# Patient Record
Sex: FEMALE | Race: OTHER | NOT HISPANIC OR LATINO | URBAN - METROPOLITAN AREA
[De-identification: names, ages, dates, MRNs, and addresses within clinical notes are randomized per-mention and may not be internally consistent; named-entity substitution may affect disease eponyms.]

---

## 2017-12-30 ENCOUNTER — INPATIENT (INPATIENT)
Facility: HOSPITAL | Age: 82
LOS: 0 days | Discharge: HOME | DRG: 392 | End: 2017-12-31
Attending: INTERNAL MEDICINE | Admitting: INTERNAL MEDICINE
Payer: COMMERCIAL

## 2017-12-30 VITALS
OXYGEN SATURATION: 99 % | HEART RATE: 67 BPM | SYSTOLIC BLOOD PRESSURE: 152 MMHG | RESPIRATION RATE: 18 BRPM | TEMPERATURE: 98 F | WEIGHT: 184.97 LBS | HEIGHT: 65 IN | DIASTOLIC BLOOD PRESSURE: 79 MMHG

## 2017-12-30 DIAGNOSIS — E78.5 HYPERLIPIDEMIA, UNSPECIFIED: ICD-10-CM

## 2017-12-30 DIAGNOSIS — Z29.9 ENCOUNTER FOR PROPHYLACTIC MEASURES, UNSPECIFIED: ICD-10-CM

## 2017-12-30 DIAGNOSIS — R07.9 CHEST PAIN, UNSPECIFIED: ICD-10-CM

## 2017-12-30 DIAGNOSIS — K21.9 GASTRO-ESOPHAGEAL REFLUX DISEASE WITHOUT ESOPHAGITIS: ICD-10-CM

## 2017-12-30 DIAGNOSIS — M19.90 UNSPECIFIED OSTEOARTHRITIS, UNSPECIFIED SITE: ICD-10-CM

## 2017-12-30 LAB
ALBUMIN SERPL ELPH-MCNC: 3.4 G/DL — LOW (ref 3.5–5)
ALP SERPL-CCNC: 75 U/L — SIGNIFICANT CHANGE UP (ref 40–120)
ALT FLD-CCNC: 22 U/L DA — SIGNIFICANT CHANGE UP (ref 10–60)
ANION GAP SERPL CALC-SCNC: 9 MMOL/L — SIGNIFICANT CHANGE UP (ref 5–17)
APTT BLD: 28.4 SEC — SIGNIFICANT CHANGE UP (ref 27.5–37.4)
AST SERPL-CCNC: 18 U/L — SIGNIFICANT CHANGE UP (ref 10–40)
BASOPHILS # BLD AUTO: 0.1 K/UL — SIGNIFICANT CHANGE UP (ref 0–0.2)
BASOPHILS NFR BLD AUTO: 1 % — SIGNIFICANT CHANGE UP (ref 0–2)
BILIRUB SERPL-MCNC: 0.3 MG/DL — SIGNIFICANT CHANGE UP (ref 0.2–1.2)
BUN SERPL-MCNC: 17 MG/DL — SIGNIFICANT CHANGE UP (ref 7–18)
CALCIUM SERPL-MCNC: 9.1 MG/DL — SIGNIFICANT CHANGE UP (ref 8.4–10.5)
CHLORIDE SERPL-SCNC: 106 MMOL/L — SIGNIFICANT CHANGE UP (ref 96–108)
CK MB BLD-MCNC: 1.8 % — SIGNIFICANT CHANGE UP (ref 0–3.5)
CK MB CFR SERPL CALC: 1.5 NG/ML — SIGNIFICANT CHANGE UP (ref 0–3.6)
CK SERPL-CCNC: 85 U/L — SIGNIFICANT CHANGE UP (ref 21–215)
CO2 SERPL-SCNC: 27 MMOL/L — SIGNIFICANT CHANGE UP (ref 22–31)
CREAT SERPL-MCNC: 0.94 MG/DL — SIGNIFICANT CHANGE UP (ref 0.5–1.3)
EOSINOPHIL # BLD AUTO: 0.2 K/UL — SIGNIFICANT CHANGE UP (ref 0–0.5)
EOSINOPHIL NFR BLD AUTO: 3.3 % — SIGNIFICANT CHANGE UP (ref 0–6)
GLUCOSE SERPL-MCNC: 94 MG/DL — SIGNIFICANT CHANGE UP (ref 70–99)
HCT VFR BLD CALC: 40.5 % — SIGNIFICANT CHANGE UP (ref 34.5–45)
HGB BLD-MCNC: 12.9 G/DL — SIGNIFICANT CHANGE UP (ref 11.5–15.5)
INR BLD: 1.07 RATIO — SIGNIFICANT CHANGE UP (ref 0.88–1.16)
LYMPHOCYTES # BLD AUTO: 1.5 K/UL — SIGNIFICANT CHANGE UP (ref 1–3.3)
LYMPHOCYTES # BLD AUTO: 27.6 % — SIGNIFICANT CHANGE UP (ref 13–44)
MCHC RBC-ENTMCNC: 27.5 PG — SIGNIFICANT CHANGE UP (ref 27–34)
MCHC RBC-ENTMCNC: 31.8 GM/DL — LOW (ref 32–36)
MCV RBC AUTO: 86.5 FL — SIGNIFICANT CHANGE UP (ref 80–100)
MONOCYTES # BLD AUTO: 0.5 K/UL — SIGNIFICANT CHANGE UP (ref 0–0.9)
MONOCYTES NFR BLD AUTO: 9 % — SIGNIFICANT CHANGE UP (ref 2–14)
NEUTROPHILS # BLD AUTO: 3.2 K/UL — SIGNIFICANT CHANGE UP (ref 1.8–7.4)
NEUTROPHILS NFR BLD AUTO: 59.1 % — SIGNIFICANT CHANGE UP (ref 43–77)
PLATELET # BLD AUTO: 186 K/UL — SIGNIFICANT CHANGE UP (ref 150–400)
POTASSIUM SERPL-MCNC: 4.7 MMOL/L — SIGNIFICANT CHANGE UP (ref 3.5–5.3)
POTASSIUM SERPL-SCNC: 4.7 MMOL/L — SIGNIFICANT CHANGE UP (ref 3.5–5.3)
PROT SERPL-MCNC: 6.9 G/DL — SIGNIFICANT CHANGE UP (ref 6–8.3)
PROTHROM AB SERPL-ACNC: 11.7 SEC — SIGNIFICANT CHANGE UP (ref 9.8–12.7)
RBC # BLD: 4.69 M/UL — SIGNIFICANT CHANGE UP (ref 3.8–5.2)
RBC # FLD: 15.2 % — HIGH (ref 10.3–14.5)
SODIUM SERPL-SCNC: 142 MMOL/L — SIGNIFICANT CHANGE UP (ref 135–145)
TROPONIN I SERPL-MCNC: <0.015 NG/ML — SIGNIFICANT CHANGE UP (ref 0–0.04)
WBC # BLD: 5.3 K/UL — SIGNIFICANT CHANGE UP (ref 3.8–10.5)
WBC # FLD AUTO: 5.3 K/UL — SIGNIFICANT CHANGE UP (ref 3.8–10.5)

## 2017-12-30 PROCEDURE — 71020: CPT | Mod: 26

## 2017-12-30 PROCEDURE — 99285 EMERGENCY DEPT VISIT HI MDM: CPT

## 2017-12-30 RX ORDER — FAMOTIDINE 10 MG/ML
20 INJECTION INTRAVENOUS
Qty: 0 | Refills: 0 | Status: DISCONTINUED | OUTPATIENT
Start: 2017-12-30 | End: 2017-12-31

## 2017-12-30 RX ORDER — ASPIRIN/CALCIUM CARB/MAGNESIUM 324 MG
162 TABLET ORAL ONCE
Qty: 0 | Refills: 0 | Status: COMPLETED | OUTPATIENT
Start: 2017-12-30 | End: 2017-12-30

## 2017-12-30 RX ORDER — ASPIRIN/CALCIUM CARB/MAGNESIUM 324 MG
81 TABLET ORAL DAILY
Qty: 0 | Refills: 0 | Status: DISCONTINUED | OUTPATIENT
Start: 2017-12-30 | End: 2017-12-31

## 2017-12-30 RX ORDER — SODIUM CHLORIDE 9 MG/ML
3 INJECTION INTRAMUSCULAR; INTRAVENOUS; SUBCUTANEOUS ONCE
Qty: 0 | Refills: 0 | Status: COMPLETED | OUTPATIENT
Start: 2017-12-30 | End: 2017-12-30

## 2017-12-30 RX ORDER — ENOXAPARIN SODIUM 100 MG/ML
40 INJECTION SUBCUTANEOUS DAILY
Qty: 0 | Refills: 0 | Status: DISCONTINUED | OUTPATIENT
Start: 2017-12-30 | End: 2017-12-31

## 2017-12-30 RX ORDER — METOPROLOL TARTRATE 50 MG
12.5 TABLET ORAL
Qty: 0 | Refills: 0 | Status: DISCONTINUED | OUTPATIENT
Start: 2017-12-30 | End: 2017-12-31

## 2017-12-30 RX ORDER — ATORVASTATIN CALCIUM 80 MG/1
40 TABLET, FILM COATED ORAL AT BEDTIME
Qty: 0 | Refills: 0 | Status: DISCONTINUED | OUTPATIENT
Start: 2017-12-30 | End: 2017-12-31

## 2017-12-30 RX ORDER — METOPROLOL TARTRATE 50 MG
25 TABLET ORAL
Qty: 0 | Refills: 0 | Status: DISCONTINUED | OUTPATIENT
Start: 2017-12-30 | End: 2017-12-30

## 2017-12-30 RX ORDER — METOCLOPRAMIDE HCL 10 MG
1 TABLET ORAL
Qty: 0 | Refills: 0 | COMMUNITY

## 2017-12-30 RX ADMIN — SODIUM CHLORIDE 3 MILLILITER(S): 9 INJECTION INTRAMUSCULAR; INTRAVENOUS; SUBCUTANEOUS at 15:41

## 2017-12-30 RX ADMIN — ATORVASTATIN CALCIUM 40 MILLIGRAM(S): 80 TABLET, FILM COATED ORAL at 23:01

## 2017-12-30 RX ADMIN — Medication 81 MILLIGRAM(S): at 23:01

## 2017-12-30 RX ADMIN — Medication 162 MILLIGRAM(S): at 15:44

## 2017-12-30 NOTE — H&P ADULT - MUSCULOSKELETAL
detailed exam no joint erythema/no joint warmth/normal strength/no joint swelling/ROM intact/no calf tenderness

## 2017-12-30 NOTE — H&P ADULT - HISTORY OF PRESENT ILLNESS
89 Os[place] speaking F ( daughter at bedside interpreting for patient) from home lives alone, no HHA, physically active, with PMH of HLD, arthritis, GERD, presented to ED with c/o chest pain for 10 days. Patient describes chest pain as left sided, non radiating, intermittent, 5-6 intensity, stabbing kind, associated with diaphoresis, no aggravating factors but relieved with rest, denies any SOB, palpitations, nausea, vomiting, dizziness. Patient got concerned as symptoms were not resolving so decided to come to ED. Otherwise denies any other symptoms.    In ED, patient had stable vitals.  EKG- NSR @66BPM , RBBB, non specific T wave changes.  cardiac enzymes x1- negative, CXR s/o  no acute pulmonary disease. Labs WNL , s/p 1 dose of aspirin 162 mg in ED    will admit to telemetry for evaluation of chest pain to r/o ACS.

## 2017-12-30 NOTE — H&P ADULT - NSHPLABSRESULTS_GEN_ALL_CORE
ICU Vital Signs Last 24 Hrs  T(C): 36.7 (30 Dec 2017 19:43), Max: 36.8 (30 Dec 2017 16:13)  T(F): 98.1 (30 Dec 2017 19:43), Max: 98.2 (30 Dec 2017 16:13)  HR: 64 (30 Dec 2017 19:43) (64 - 67)  BP: 134/90 (30 Dec 2017 19:43) (134/90 - 152/79)  BP(mean): --  ABP: --  ABP(mean): --  RR: 18 (30 Dec 2017 19:43) (18 - 18)  SpO2: 97% (30 Dec 2017 19:43) (97% - 99%)                          12.9   5.3   )-----------( 186      ( 30 Dec 2017 16:27 )             40.5       12-30    142  |  106  |  17  ----------------------------<  94  4.7   |  27  |  0.94    Ca    9.1      30 Dec 2017 16:27    TPro  6.9  /  Alb  3.4<L>  /  TBili  0.3  /  DBili  x   /  AST  18  /  ALT  22  /  AlkPhos  75  12-30                  PT/INR - ( 30 Dec 2017 16:27 )   PT: 11.7 sec;   INR: 1.07 ratio         PTT - ( 30 Dec 2017 16:27 )  PTT:28.4 sec    Lactate Trend      CARDIAC MARKERS ( 30 Dec 2017 16:27 )  <0.015 ng/mL / x     / 85 U/L / x     / 1.5 ng/mL  < from: Xray Chest 2 Views PA/Lat (12.30.17 @ 15:25) >    IMPRESSION: Clear lungs.    < end of copied text >

## 2017-12-30 NOTE — H&P ADULT - RS GEN PE MLT RESP DETAILS PC
no rales/respirations non-labored/no rhonchi/airway patent/breath sounds equal/good air movement/clear to auscultation bilaterally

## 2017-12-30 NOTE — ED ADULT NURSE NOTE - OBJECTIVE STATEMENT
Patient came to the ED sent by PMD for non radiating chest pain  x 2 days. Pt is a/o x 3 no complaints of any dizziness/ syncope.

## 2017-12-30 NOTE — H&P ADULT - PROBLEM SELECTOR PLAN 3
Patient takes some medication for arthritis, but does not remember the name, primary team to update medrics, daughter to get medication in AM

## 2017-12-30 NOTE — H&P ADULT - NSHPREVIEWOFSYSTEMS_GEN_ALL_CORE
ROS negative for fever, SOB, cough, rash, headache, dizziness, diaphoresis, palpitations, abdominal pain, weakness, numbness, tingling of extremities, diarrhea, constipation, urinary disturbances.

## 2017-12-30 NOTE — ED PROVIDER NOTE - OBJECTIVE STATEMENT
89 F with hx of HLD complaining of chest pain for a few days.  Non radiating, no shortness of breath, L sided.  No other complaints.

## 2017-12-30 NOTE — H&P ADULT - PROBLEM SELECTOR PLAN 1
likely non cardiac, more likely due to GERD, but given Hx of HLD, female sex will admit to telemetry to r/o ACS.  EKG- NSR @66BPM , RBBB, non specific T wave changes.  cardiac enzymes x1- negative  KAREEM score of 3  will start on aspirin, statin and low dose lopressor.  will trend T2, T3.  will get echo, tsh, a1c, lipid profile.

## 2017-12-30 NOTE — H&P ADULT - PROBLEM SELECTOR PLAN 4
patient takes famotidine BID and metoclopramide at home.  will give only famotidine 20 mg BID at home.

## 2017-12-30 NOTE — H&P ADULT - NEUROLOGICAL DETAILS
deep reflexes intact/normal strength/alert and oriented x 3/sensation intact/cranial nerves intact/superficial reflexes intact

## 2017-12-30 NOTE — H&P ADULT - ASSESSMENT
89 [place] speaking F ( daughter at bedside interpreting for patient) from home lives alone, no HHA, physically active, with PMH of HLD, arthritis, GERD, presented to ED with c/o chest pain for 10 days.     In ED, patient had stable vitals.  EKG- NSR @66BPM , RBBB, non specific T wave changes.  cardiac enzymes x1- negative, CXR s/o  no acute pulmonary disease. Labs WNL , s/p 1 dose of aspirin 162 mg in ED    will admit to telemetry for evaluation of chest pain to r/o ACS.

## 2017-12-31 VITALS
DIASTOLIC BLOOD PRESSURE: 65 MMHG | OXYGEN SATURATION: 100 % | SYSTOLIC BLOOD PRESSURE: 105 MMHG | HEART RATE: 60 BPM | TEMPERATURE: 98 F | RESPIRATION RATE: 18 BRPM

## 2017-12-31 LAB
ANION GAP SERPL CALC-SCNC: 6 MMOL/L — SIGNIFICANT CHANGE UP (ref 5–17)
BUN SERPL-MCNC: 16 MG/DL — SIGNIFICANT CHANGE UP (ref 7–18)
CALCIUM SERPL-MCNC: 9.1 MG/DL — SIGNIFICANT CHANGE UP (ref 8.4–10.5)
CHLORIDE SERPL-SCNC: 107 MMOL/L — SIGNIFICANT CHANGE UP (ref 96–108)
CHOLEST SERPL-MCNC: 176 MG/DL — SIGNIFICANT CHANGE UP (ref 10–199)
CK MB BLD-MCNC: 1.8 % — SIGNIFICANT CHANGE UP (ref 0–3.5)
CK MB BLD-MCNC: 1.9 % — SIGNIFICANT CHANGE UP (ref 0–3.5)
CK MB CFR SERPL CALC: 1.2 NG/ML — SIGNIFICANT CHANGE UP (ref 0–3.6)
CK MB CFR SERPL CALC: 1.4 NG/ML — SIGNIFICANT CHANGE UP (ref 0–3.6)
CK SERPL-CCNC: 65 U/L — SIGNIFICANT CHANGE UP (ref 21–215)
CK SERPL-CCNC: 72 U/L — SIGNIFICANT CHANGE UP (ref 21–215)
CO2 SERPL-SCNC: 28 MMOL/L — SIGNIFICANT CHANGE UP (ref 22–31)
CREAT SERPL-MCNC: 0.86 MG/DL — SIGNIFICANT CHANGE UP (ref 0.5–1.3)
FOLATE SERPL-MCNC: 16.7 NG/ML — SIGNIFICANT CHANGE UP (ref 4.8–24.2)
GLUCOSE SERPL-MCNC: 90 MG/DL — SIGNIFICANT CHANGE UP (ref 70–99)
HBA1C BLD-MCNC: 5.8 % — HIGH (ref 4–5.6)
HCT VFR BLD CALC: 42 % — SIGNIFICANT CHANGE UP (ref 34.5–45)
HDLC SERPL-MCNC: 60 MG/DL — SIGNIFICANT CHANGE UP (ref 40–125)
HGB BLD-MCNC: 13.1 G/DL — SIGNIFICANT CHANGE UP (ref 11.5–15.5)
LIPID PNL WITH DIRECT LDL SERPL: 87 MG/DL — SIGNIFICANT CHANGE UP
MAGNESIUM SERPL-MCNC: 2 MG/DL — SIGNIFICANT CHANGE UP (ref 1.6–2.6)
MCHC RBC-ENTMCNC: 26.7 PG — LOW (ref 27–34)
MCHC RBC-ENTMCNC: 31.1 GM/DL — LOW (ref 32–36)
MCV RBC AUTO: 85.9 FL — SIGNIFICANT CHANGE UP (ref 80–100)
PHOSPHATE SERPL-MCNC: 4.5 MG/DL — SIGNIFICANT CHANGE UP (ref 2.5–4.5)
PLATELET # BLD AUTO: 195 K/UL — SIGNIFICANT CHANGE UP (ref 150–400)
POTASSIUM SERPL-MCNC: 4.5 MMOL/L — SIGNIFICANT CHANGE UP (ref 3.5–5.3)
POTASSIUM SERPL-SCNC: 4.5 MMOL/L — SIGNIFICANT CHANGE UP (ref 3.5–5.3)
RBC # BLD: 4.9 M/UL — SIGNIFICANT CHANGE UP (ref 3.8–5.2)
RBC # FLD: 15.3 % — HIGH (ref 10.3–14.5)
SODIUM SERPL-SCNC: 141 MMOL/L — SIGNIFICANT CHANGE UP (ref 135–145)
TOTAL CHOLESTEROL/HDL RATIO MEASUREMENT: 2.9 RATIO — LOW (ref 3.3–7.1)
TRIGL SERPL-MCNC: 147 MG/DL — SIGNIFICANT CHANGE UP (ref 10–149)
TROPONIN I SERPL-MCNC: <0.015 NG/ML — SIGNIFICANT CHANGE UP (ref 0–0.04)
TROPONIN I SERPL-MCNC: <0.015 NG/ML — SIGNIFICANT CHANGE UP (ref 0–0.04)
TSH SERPL-MCNC: 2.59 UU/ML — SIGNIFICANT CHANGE UP (ref 0.34–4.82)
VIT B12 SERPL-MCNC: 213 PG/ML — LOW (ref 232–1245)
WBC # BLD: 5.5 K/UL — SIGNIFICANT CHANGE UP (ref 3.8–10.5)
WBC # FLD AUTO: 5.5 K/UL — SIGNIFICANT CHANGE UP (ref 3.8–10.5)

## 2017-12-31 PROCEDURE — 82550 ASSAY OF CK (CPK): CPT

## 2017-12-31 PROCEDURE — 80061 LIPID PANEL: CPT

## 2017-12-31 PROCEDURE — 82553 CREATINE MB FRACTION: CPT

## 2017-12-31 PROCEDURE — 93306 TTE W/DOPPLER COMPLETE: CPT

## 2017-12-31 PROCEDURE — 84443 ASSAY THYROID STIM HORMONE: CPT

## 2017-12-31 PROCEDURE — 85730 THROMBOPLASTIN TIME PARTIAL: CPT

## 2017-12-31 PROCEDURE — 82746 ASSAY OF FOLIC ACID SERUM: CPT

## 2017-12-31 PROCEDURE — 82607 VITAMIN B-12: CPT

## 2017-12-31 PROCEDURE — 83036 HEMOGLOBIN GLYCOSYLATED A1C: CPT

## 2017-12-31 PROCEDURE — 99285 EMERGENCY DEPT VISIT HI MDM: CPT | Mod: 25

## 2017-12-31 PROCEDURE — 82306 VITAMIN D 25 HYDROXY: CPT

## 2017-12-31 PROCEDURE — 80048 BASIC METABOLIC PNL TOTAL CA: CPT

## 2017-12-31 PROCEDURE — 85027 COMPLETE CBC AUTOMATED: CPT

## 2017-12-31 PROCEDURE — 85610 PROTHROMBIN TIME: CPT

## 2017-12-31 PROCEDURE — 84100 ASSAY OF PHOSPHORUS: CPT

## 2017-12-31 PROCEDURE — 83735 ASSAY OF MAGNESIUM: CPT

## 2017-12-31 PROCEDURE — 80053 COMPREHEN METABOLIC PANEL: CPT

## 2017-12-31 PROCEDURE — 84484 ASSAY OF TROPONIN QUANT: CPT

## 2017-12-31 PROCEDURE — 93005 ELECTROCARDIOGRAM TRACING: CPT

## 2017-12-31 PROCEDURE — 71046 X-RAY EXAM CHEST 2 VIEWS: CPT

## 2017-12-31 RX ORDER — FAMOTIDINE 10 MG/ML
1 INJECTION INTRAVENOUS
Qty: 0 | Refills: 0 | COMMUNITY

## 2017-12-31 RX ORDER — FAMOTIDINE 10 MG/ML
1 INJECTION INTRAVENOUS
Qty: 60 | Refills: 0 | OUTPATIENT
Start: 2017-12-31 | End: 2018-01-29

## 2017-12-31 RX ORDER — ASPIRIN/CALCIUM CARB/MAGNESIUM 324 MG
1 TABLET ORAL
Qty: 30 | Refills: 0 | OUTPATIENT
Start: 2017-12-31 | End: 2018-01-29

## 2017-12-31 RX ORDER — SIMVASTATIN 20 MG/1
1 TABLET, FILM COATED ORAL
Qty: 0 | Refills: 0 | COMMUNITY

## 2017-12-31 RX ORDER — ATORVASTATIN CALCIUM 80 MG/1
1 TABLET, FILM COATED ORAL
Qty: 0 | Refills: 0 | COMMUNITY
Start: 2017-12-31

## 2017-12-31 RX ORDER — PREGABALIN 225 MG/1
1000 CAPSULE ORAL ONCE
Qty: 0 | Refills: 0 | Status: COMPLETED | OUTPATIENT
Start: 2017-12-31 | End: 2017-12-31

## 2017-12-31 RX ORDER — ASPIRIN/CALCIUM CARB/MAGNESIUM 324 MG
1 TABLET ORAL
Qty: 0 | Refills: 0 | COMMUNITY
Start: 2017-12-31

## 2017-12-31 RX ORDER — ATORVASTATIN CALCIUM 80 MG/1
1 TABLET, FILM COATED ORAL
Qty: 30 | Refills: 0 | OUTPATIENT
Start: 2017-12-31 | End: 2018-01-29

## 2017-12-31 RX ADMIN — FAMOTIDINE 20 MILLIGRAM(S): 10 INJECTION INTRAVENOUS at 05:47

## 2017-12-31 RX ADMIN — PREGABALIN 1000 MICROGRAM(S): 225 CAPSULE ORAL at 19:47

## 2017-12-31 RX ADMIN — Medication 12.5 MILLIGRAM(S): at 05:47

## 2017-12-31 RX ADMIN — FAMOTIDINE 20 MILLIGRAM(S): 10 INJECTION INTRAVENOUS at 16:28

## 2017-12-31 RX ADMIN — ENOXAPARIN SODIUM 40 MILLIGRAM(S): 100 INJECTION SUBCUTANEOUS at 16:28

## 2017-12-31 RX ADMIN — Medication 81 MILLIGRAM(S): at 16:28

## 2017-12-31 NOTE — DISCHARGE NOTE ADULT - ADDITIONAL INSTRUCTIONS
You were also found to be low in Vitamin B12 (serum level 213), therefore you were given a one-time injection of 1000 units. Please follow up a repeat Vitamin B12 level with your primary care doctor.

## 2017-12-31 NOTE — DISCHARGE NOTE ADULT - HOSPITAL COURSE
89 Cambodian speaking F ( daughter at bedside interpreting for patient) from home lives alone, no HHA, physically active, with PMH of HLD, arthritis, GERD, presented to ED with c/o chest pain for 10 days. In ED, patient had stable vitals.  EKG- NSR @66BPM , RBBB, non specific T wave changes.   CXR s/o  no acute pulmonary disease. Labs WNL , s/p 1 dose of aspirin 162 mg in ED. Pt admitted to telemetry for evaluation of chest pain to r/o ACS. likely non cardiac, more likely due to GERD,.  cardiac enzymes negative x3. Pt started on aspirin, statin, and low dose lopressor.  Pt lipid panel wnl. Pt will continue on simvastatin 40mg at home. Pt found to be low in Vitamin B12, given 1 IM injection.     Pt had echo performed, however did not want to wait for results.  Pt AOx3, not complaining of any chest pain, hemodynamically stable, no telemetry events in the past 24 hours. Discussion between pt, pt daughter, myself, and attending consensus is that pt will follow-up results with PMD as well as cardiologist who is in [facility] and will be able to view echocardiogram report.    Pt is medically stable for discharge as per attending. Pt to follow up echocardiogram results in 1-2 days as above.

## 2017-12-31 NOTE — DISCHARGE NOTE ADULT - CARE PROVIDER_API CALL
Minor Garsia), Internal Medicine  [address] [place] [address]  Phone: [phone]  Fax: [phone]

## 2017-12-31 NOTE — DISCHARGE NOTE ADULT - PATIENT PORTAL LINK FT
“You can access the [facility] Patient Portal, offered by [facility], by registering with the following website: [email or web address]”

## 2017-12-31 NOTE — DISCHARGE NOTE ADULT - PLAN OF CARE
Continue with home medications as described above. Continue with home dose medications for arthritis. Continue with atorvastatin as described above. Continue to follow a diet low in saturated fats and exercise as tolerated is encouraged for at least 40 minutes for 4-5 days per week. resolution of chest pain EKG showed no signs of ischemia, cardiac enzymes were negative. and echocardiogram was performed. Please follow up with your primary care doctor in 1-2 days for echocardiogram results. Please follow up with your cardiologist as well.

## 2017-12-31 NOTE — DISCHARGE NOTE ADULT - CARE PLAN
Principal Discharge DX:	Chest pain  Goal:	resolution of chest pain  Instructions for follow-up, activity and diet:	EKG showed no signs of ischemia, cardiac enzymes were negative. and echocardiogram was performed. Please follow up with your primary care doctor in 1-2 days for echocardiogram results. Please follow up with your cardiologist as well.  Secondary Diagnosis:	GERD (gastroesophageal reflux disease)  Instructions for follow-up, activity and diet:	Continue with home medications as described above.  Secondary Diagnosis:	Arthritis  Instructions for follow-up, activity and diet:	Continue with home dose medications for arthritis.  Secondary Diagnosis:	HLD (hyperlipidemia)  Instructions for follow-up, activity and diet:	Continue with atorvastatin as described above. Continue to follow a diet low in saturated fats and exercise as tolerated is encouraged for at least 40 minutes for 4-5 days per week.

## 2017-12-31 NOTE — DISCHARGE NOTE ADULT - MEDICATION SUMMARY - MEDICATIONS TO TAKE
I will START or STAY ON the medications listed below when I get home from the hospital:    aspirin 81 mg oral delayed release tablet  -- 1 tab(s) by mouth once a day  -- Indication: For Prophylactic measure    metoclopramide 5 mg oral tablet  -- 1 tab(s) by mouth once a day (at bedtime)  -- Indication: For GERD (gastroesophageal reflux disease)    atorvastatin 40 mg oral tablet  -- 1 tab(s) by mouth once a day (at bedtime)  -- Indication: For HLD (hyperlipidemia)    famotidine 20 mg oral tablet  -- 1 tab(s) by mouth 2 times a day  -- Indication: For GERD (gastroesophageal reflux disease)

## 2017-12-31 NOTE — PROGRESS NOTE ADULT - ASSESSMENT
Patient is a 89y old  Female who presents with a chief complaint of chest pain x 10 days (30 Dec 2017 18:57)  appears comfortable, spoke to daughter on phone,    INTERVAL HPI/OVERNIGHT EVENTS: none  T(C): 36.8 (12-31-17 @ 12:05), Max: 36.8 (12-30-17 @ 16:13)  HR: 55 (12-31-17 @ 12:05) (55 - 67)  BP: 121/74 (12-31-17 @ 12:05) (121/74 - 153/85)  RR: 16 (12-31-17 @ 12:05) (16 - 18)  SpO2: 99% (12-31-17 @ 12:05) (96% - 99%)  Wt(kg): --  I&O's Summary      LABS:                        13.1   5.5   )-----------( 195      ( 31 Dec 2017 06:57 )             42.0     12-31    141  |  107  |  16  ----------------------------<  90  4.5   |  28  |  0.86    Ca    9.1      31 Dec 2017 06:57  Phos  4.5     12-31  Mg     2.0     12-31    TPro  6.9  /  Alb  3.4<L>  /  TBili  0.3  /  DBili  x   /  AST  18  /  ALT  22  /  AlkPhos  75  12-30    PT/INR - ( 30 Dec 2017 16:27 )   PT: 11.7 sec;   INR: 1.07 ratio         PTT - ( 30 Dec 2017 16:27 )  PTT:28.4 sec    CAPILLARY BLOOD GLUCOSE      REVIEW OF SYSTEMS:  CONSTITUTIONAL: No fever, weight loss, or fatigue  EYES: No eye pain, visual disturbances, or discharge  ENMT:  No difficulty hearing, tinnitus, vertigo; No sinus or throat pain  NECK: No pain or stiffness  BREASTS: No pain, masses, or nipple discharge  RESPIRATORY: No cough, wheezing, chills or hemoptysis; No shortness of breath  CARDIOVASCULAR: No chest pain, palpitations, dizziness, or leg swelling  GASTROINTESTINAL: No abdominal or epigastric pain. No nausea, vomiting, or hematemesis; No diarrhea or constipation. No melena or hematochezia.  GENITOURINARY: No dysuria, frequency, hematuria, or incontinence  NEUROLOGICAL: No headaches, memory loss, loss of strength, numbness, or tremors  SKIN: No itching, burning, rashes, or lesions   LYMPH NODES: No enlarged glands  ENDOCRINE: No heat or cold intolerance; No hair loss  MUSCULOSKELETAL: No joint pain or swelling; No muscle, back, or extremity pain  PSYCHIATRIC: No depression, anxiety, mood swings, or difficulty sleeping  HEME/LYMPH: No easy bruising, or bleeding gums  ALLERY AND IMMUNOLOGIC: No hives or eczema    RADIOLOGY & ADDITIONAL TESTS:    Imaging Personally Reviewed:  [ ] YES  [ ] NO    Consultant(s) Notes Reviewed:  [ ] YES  [ ] NO    PHYSICAL EXAM:  GENERAL: NAD, well-groomed, well-developed  HEAD:  Atraumatic, Normocephalic  EYES: EOMI, PERRLA, conjunctiva and sclera clear  ENMT: No tonsillar erythema, exudates, or enlargement; Moist mucous membranes, Good dentition, No lesions  NECK: Supple, No JVD, Normal thyroid  NERVOUS SYSTEM:  Alert & Oriented X3, Good concentration; Motor Strength 5/5 B/L upper and lower extremities; DTRs 2+ intact and symmetric  CHEST/LUNG: Clear to percussion bilaterally; No rales, rhonchi, wheezing, or rubs  HEART: Regular rate and rhythm; No murmurs, rubs, or gallops  ABDOMEN: Soft, Nontender, Nondistended; Bowel sounds present  EXTREMITIES:  2+ Peripheral Pulses, No clubbing, cyanosis, or edema  LYMPH: No lymphadenopathy noted  SKIN: No rashes or lesions    Care Discussed with Consultants/Other Providers [ ] YES  [ ] NO    aspirin enteric coated 81 milliGRAM(s) Oral daily  atorvastatin 40 milliGRAM(s) Oral at bedtime  enoxaparin Injectable 40 milliGRAM(s) SubCutaneous daily  famotidine    Tablet 20 milliGRAM(s) Oral two times a day  metoprolol     tartrate 12.5 milliGRAM(s) Oral two times a day      A/P   Atypical chest pain, GERD, HLD, Vit b12 deficiency, BMI 30.8  Sinus brdycardia sec to B blocker.    will monitor, d/c Metoprolol.

## 2017-12-31 NOTE — DISCHARGE NOTE ADULT - MEDICATION SUMMARY - MEDICATIONS TO CHANGE
I will SWITCH the dose or number of times a day I take the medications listed below when I get home from the hospital:    simvastatin 40 mg oral tablet  -- 1 tab(s) by mouth once a day (at bedtime)

## 2018-01-01 LAB — 24R-OH-CALCIDIOL SERPL-MCNC: 38.8 NG/ML — SIGNIFICANT CHANGE UP (ref 30–80)

## 2019-11-15 NOTE — PATIENT PROFILE ADULT. - BLOOD AVOIDANCE/RESTRICTIONS, PROFILE
Discharge Note:    Discharge instructions reviewed and given to patient. Pt verbalizes understanding of discharge plan and instructions. Reviewed upcoming aftercare appointments and medication schedules.   After visit summary given to patient.         none